# Patient Record
Sex: FEMALE | Race: WHITE | Employment: FULL TIME
[De-identification: names, ages, dates, MRNs, and addresses within clinical notes are randomized per-mention and may not be internally consistent; named-entity substitution may affect disease eponyms.]

---

## 2017-06-17 ENCOUNTER — HEALTH MAINTENANCE LETTER (OUTPATIENT)
Age: 37
End: 2017-06-17

## 2018-10-24 ENCOUNTER — HOSPITAL ENCOUNTER (EMERGENCY)
Facility: CLINIC | Age: 38
Discharge: HOME | End: 2018-10-24
Attending: EMERGENCY MEDICINE | Admitting: EMERGENCY MEDICINE
Payer: COMMERCIAL

## 2018-10-24 ENCOUNTER — APPOINTMENT (OUTPATIENT)
Dept: ULTRASOUND IMAGING | Facility: CLINIC | Age: 38
End: 2018-10-24
Attending: EMERGENCY MEDICINE
Payer: COMMERCIAL

## 2018-10-24 VITALS
TEMPERATURE: 98 F | OXYGEN SATURATION: 98 % | SYSTOLIC BLOOD PRESSURE: 114 MMHG | DIASTOLIC BLOOD PRESSURE: 70 MMHG | RESPIRATION RATE: 18 BRPM

## 2018-10-24 DIAGNOSIS — R10.9 ABDOMINAL PAIN, UNSPECIFIED ABDOMINAL LOCATION: ICD-10-CM

## 2018-10-24 LAB
ALBUMIN SERPL-MCNC: 3.6 G/DL (ref 3.4–5)
ALBUMIN UR-MCNC: NEGATIVE MG/DL
ALP SERPL-CCNC: 60 U/L (ref 40–150)
ALT SERPL W P-5'-P-CCNC: 20 U/L (ref 0–50)
ANION GAP SERPL CALCULATED.3IONS-SCNC: 5 MMOL/L (ref 3–14)
APPEARANCE UR: CLEAR
AST SERPL W P-5'-P-CCNC: 21 U/L (ref 0–45)
BASOPHILS # BLD AUTO: 0 10E9/L (ref 0–0.2)
BASOPHILS NFR BLD AUTO: 0.3 %
BILIRUB SERPL-MCNC: 0.6 MG/DL (ref 0.2–1.3)
BILIRUB UR QL STRIP: NEGATIVE
BUN SERPL-MCNC: 9 MG/DL (ref 7–30)
CALCIUM SERPL-MCNC: 8.5 MG/DL (ref 8.5–10.1)
CHLORIDE SERPL-SCNC: 106 MMOL/L (ref 94–109)
CO2 SERPL-SCNC: 29 MMOL/L (ref 20–32)
COLOR UR AUTO: YELLOW
CREAT SERPL-MCNC: 0.78 MG/DL (ref 0.52–1.04)
DIFFERENTIAL METHOD BLD: NORMAL
EOSINOPHIL # BLD AUTO: 0.1 10E9/L (ref 0–0.7)
EOSINOPHIL NFR BLD AUTO: 1.5 %
ERYTHROCYTE [DISTWIDTH] IN BLOOD BY AUTOMATED COUNT: 13 % (ref 10–15)
GFR SERPL CREATININE-BSD FRML MDRD: 83 ML/MIN/1.7M2
GLUCOSE SERPL-MCNC: 91 MG/DL (ref 70–99)
GLUCOSE UR STRIP-MCNC: NEGATIVE MG/DL
HCG UR QL: NEGATIVE
HCT VFR BLD AUTO: 41.1 % (ref 35–47)
HGB BLD-MCNC: 13.7 G/DL (ref 11.7–15.7)
HGB UR QL STRIP: NEGATIVE
IMM GRANULOCYTES # BLD: 0 10E9/L (ref 0–0.4)
IMM GRANULOCYTES NFR BLD: 0.3 %
KETONES UR STRIP-MCNC: NEGATIVE MG/DL
LACTATE BLD-SCNC: 0.6 MMOL/L (ref 0.7–2)
LEUKOCYTE ESTERASE UR QL STRIP: NEGATIVE
LIPASE SERPL-CCNC: 165 U/L (ref 73–393)
LYMPHOCYTES # BLD AUTO: 1.4 10E9/L (ref 0.8–5.3)
LYMPHOCYTES NFR BLD AUTO: 16.1 %
MCH RBC QN AUTO: 33 PG (ref 26.5–33)
MCHC RBC AUTO-ENTMCNC: 33.3 G/DL (ref 31.5–36.5)
MCV RBC AUTO: 99 FL (ref 78–100)
MONOCYTES # BLD AUTO: 0.5 10E9/L (ref 0–1.3)
MONOCYTES NFR BLD AUTO: 5.7 %
MUCOUS THREADS #/AREA URNS LPF: PRESENT /LPF
NEUTROPHILS # BLD AUTO: 6.6 10E9/L (ref 1.6–8.3)
NEUTROPHILS NFR BLD AUTO: 76.1 %
NITRATE UR QL: NEGATIVE
NRBC # BLD AUTO: 0 10*3/UL
NRBC BLD AUTO-RTO: 0 /100
PH UR STRIP: 7 PH (ref 5–7)
PLATELET # BLD AUTO: 171 10E9/L (ref 150–450)
POTASSIUM SERPL-SCNC: 4.2 MMOL/L (ref 3.4–5.3)
PROT SERPL-MCNC: 7 G/DL (ref 6.8–8.8)
RBC # BLD AUTO: 4.15 10E12/L (ref 3.8–5.2)
RBC #/AREA URNS AUTO: 1 /HPF (ref 0–2)
SODIUM SERPL-SCNC: 140 MMOL/L (ref 133–144)
SOURCE: ABNORMAL
SP GR UR STRIP: 1.02 (ref 1–1.03)
SQUAMOUS #/AREA URNS AUTO: 3 /HPF (ref 0–1)
TROPONIN I SERPL-MCNC: <0.015 UG/L (ref 0–0.04)
UROBILINOGEN UR STRIP-MCNC: 2 MG/DL (ref 0–2)
WBC # BLD AUTO: 8.6 10E9/L (ref 4–11)
WBC #/AREA URNS AUTO: 1 /HPF (ref 0–5)

## 2018-10-24 PROCEDURE — 81025 URINE PREGNANCY TEST: CPT | Performed by: EMERGENCY MEDICINE

## 2018-10-24 PROCEDURE — 85025 COMPLETE CBC W/AUTO DIFF WBC: CPT | Performed by: EMERGENCY MEDICINE

## 2018-10-24 PROCEDURE — 93005 ELECTROCARDIOGRAM TRACING: CPT

## 2018-10-24 PROCEDURE — 80053 COMPREHEN METABOLIC PANEL: CPT | Performed by: EMERGENCY MEDICINE

## 2018-10-24 PROCEDURE — 25000125 ZZHC RX 250: Performed by: EMERGENCY MEDICINE

## 2018-10-24 PROCEDURE — 76705 ECHO EXAM OF ABDOMEN: CPT

## 2018-10-24 PROCEDURE — 81001 URINALYSIS AUTO W/SCOPE: CPT | Performed by: EMERGENCY MEDICINE

## 2018-10-24 PROCEDURE — 83605 ASSAY OF LACTIC ACID: CPT | Performed by: EMERGENCY MEDICINE

## 2018-10-24 PROCEDURE — 93976 VASCULAR STUDY: CPT

## 2018-10-24 PROCEDURE — 83690 ASSAY OF LIPASE: CPT | Performed by: EMERGENCY MEDICINE

## 2018-10-24 PROCEDURE — 84484 ASSAY OF TROPONIN QUANT: CPT | Performed by: EMERGENCY MEDICINE

## 2018-10-24 PROCEDURE — 25000132 ZZH RX MED GY IP 250 OP 250 PS 637: Performed by: EMERGENCY MEDICINE

## 2018-10-24 PROCEDURE — 99285 EMERGENCY DEPT VISIT HI MDM: CPT | Mod: 25

## 2018-10-24 RX ADMIN — LIDOCAINE HYDROCHLORIDE 30 ML: 20 SOLUTION ORAL; TOPICAL at 19:23

## 2018-10-24 ASSESSMENT — ENCOUNTER SYMPTOMS
ACTIVITY CHANGE: 0
VOMITING: 0
NAUSEA: 1
APPETITE CHANGE: 0
SHORTNESS OF BREATH: 1
FREQUENCY: 0
DYSURIA: 0
HEMATURIA: 0
ABDOMINAL PAIN: 1

## 2018-10-24 NOTE — ED AVS SNAPSHOT
[facility]    [address]    [facility] [address]    Phone:  [phone]    Fax:  [phone]                                       Kvng Gillis   MRN: [id number]    Department:  [facility]   Date of Visit:  10/24/2018           After Visit Summary Signature Page     I have received my discharge instructions, and my questions have been answered. I have discussed any challenges I see with this plan with the nurse or doctor.    ..........................................................................................................................................  Patient/Patient Representative Signature      ..........................................................................................................................................  Patient Representative Print Name and Relationship to Patient    ..................................................               ................................................  Date                                   Time    ..........................................................................................................................................  Reviewed by Signature/Title    ...................................................              ..............................................  Date                                               Time          22EPIC Rev 08/18

## 2018-10-24 NOTE — ED TRIAGE NOTES
Patient presents to ED due to upper abd pain. Onset developed this AM along side nausea  Denies fever, vomiting or diarrhea    ABC intact     Denies taking OTC meds     Reports having acid reflux 2 nights ago

## 2018-10-24 NOTE — ED AVS SNAPSHOT
[facility]    [address]    [facility] [address]    Phone:  [phone]    Fax:  [phone]                                       Kvng Gillis   MRN: [id number]    Department:  [facility]   Date of Visit:  10/24/2018           Patient Information     Date Of Birth          1980        Your diagnoses for this visit were:     Abdominal pain, unspecified abdominal location        You were seen by Carroll Poon MD.      Follow-up Information     Follow up with Claudette Zafar. Schedule an appointment as soon as possible for a visit in 3 days.    Contact information:    [address], [address]  [place] [address]  [phone]          Go to [facility] Emergency [facility].    Specialty:  EMERGENCY MEDICINE    Why:  If symptoms worsen    Contact information:    [address]  [facility] [address]  [phone]        Discharge Instructions       Discharge Instructions  Abdominal Pain    Abdominal pain (belly pain) can be caused by many things. Your evaluation today does not show the exact cause for your pain. Your provider today has decided that it is unlikely your pain is due to a life threatening problem, or a problem requiring surgery or hospital admission. Sometimes those problems cannot be found right away, so it is very important that you follow up as directed.  Sometimes only the changes which occur over time allow the cause of your pain to be found.    Generally, every Emergency Department visit should have a follow-up clinic visit with either a primary or a specialty clinic/provider. Please follow-up as instructed by your emergency provider today. With abdominal pain, we often recommend very close follow-up, such as the following day.    ADULTS:  Return to the Emergency Department right away if:      You get an oral temperature above 102oF or as directed by your  provider.    You have blood in your stools. This may be bright red or appear as black, tarry stools.      You keep vomiting (throwing up) or cannot drink liquids.    You see blood when you vomit.     You cannot have a bowel movement or you cannot pass gas.    Your stomach gets bloated or bigger.    Your skin or the whites of your eyes look yellow.    You faint.    You have bloody, frequent or painful urination (peeing).    You have new symptoms or anything that worries you.    CHILDREN:  Return to the Emergency Department right away if your child has any of the above-listed symptoms or the following:      Pushes your hand away or screams/cries when his/her belly is touched.    You notice your child is very fussy or weak.    Your child is very tired and is too tired to eat or drink.    Your child is dehydrated.  Signs of dehydration can be:  o Significant change in the amount of wet diapers/urine.  o Your infant or child starts to have dry mouth and lips, or no saliva (spit) or tears.    PREGNANT WOMEN:  Return to the Emergency Department right away if you have any of the above-listed symptoms or the following:      You have bleeding, leaking fluid or passing tissue from the vagina.    You have worse pain or cramping, or pain in your shoulder or back.    You have vomiting that will not stop.    You have a temperature of 100oF or more.    Your baby is not moving as much as usual.    You faint.    You get a bad headache with or without eye problems and abdominal pain.    You have a seizure.    You have unusual discharge from your vagina and abdominal pain.    Abdominal pain is pretty common during pregnancy.  Your pain may or may not be related to your pregnancy. You should follow-up closely with your OB provider so they can evaluate you and your baby.  Until you follow-up with your regular provider, do the following:       Avoid sex and do not put anything in your vagina.    Drink clear fluids.    Only take  "medications approved by your provider.    MORE INFORMATION:    Appendicitis:  A possible cause of abdominal pain in any person who still has their appendix is acute appendicitis. Appendicitis is often hard to diagnose.  Testing does not always rule out early appendicitis or other causes of abdominal pain. Close follow-up with your provider and re-evaluations may be needed to figure out the reason for your abdominal pain.    Follow-up:  It is very important that you make an appointment with your clinic and go to the appointment.  If you do not follow-up with your primary provider, it may result in missing an important development which could result in permanent injury or disability and/or lasting pain.  If there is any problem keeping your appointment, call your provider or return to the Emergency Department.    Medications:  Take your medications as directed by your provider today.  Before using over-the-counter medications, ask your provider and make sure to take the medications as directed.  If you have any questions about medications, ask your provider.    Diet:  Resume your normal diet as much as possible, but do not eat fried, fatty or spicy foods while you have pain.  Do not drink alcohol or have caffeine.  Do not smoke tobacco.    Probiotics: If you have been given an antibiotic, you may want to also take a probiotic pill or eat yogurt with live cultures. Probiotics have \"good bacteria\" to help your intestines stay healthy. Studies have shown that probiotics help prevent diarrhea (loose stools) and other intestine problems (including C. diff infection) when you take antibiotics. You can buy these without a prescription in the pharmacy section of the store.     If you were given a prescription for medicine here today, be sure to read all of the information (including the package insert) that comes with your prescription.  This will include important information about the medicine, its side effects, and any " warnings that you need to know about.  The pharmacist who fills the prescription can provide more information and answer questions you may have about the medicine.  If you have questions or concerns that the pharmacist cannot address, please call or return to the Emergency Department.       Remember that you can always come back to the Emergency Department if you are not able to see your regular provider in the amount of time listed above, if you get any new symptoms, or if there is anything that worries you.      24 Hour Appointment Hotline       To make an appointment at any [facility], call [phone] ([phone]). If you don't have a family doctor or clinic, we will help you find one. [facility] clinics are conveniently located to serve the needs of you and your family.             Review of your medicines      Our records show that you are taking the medicines listed below. If these are incorrect, please call your family doctor or clinic.        Dose / Directions Last dose taken    EFFEXOR 37.5 MG tablet   Dose:  37.5 mg   Generic drug:  venlafaxine        Take 37.5 mg by mouth once.   Refills:  0        fluticasone 50 MCG/ACT spray   Commonly known as:  FLONASE   Dose:  1-2 spray   Quantity:  1 Package        Spray 1-2 sprays into both nostrils daily.   Refills:  11                Procedures and tests performed during your visit     Abdomen US, limited (RUQ only)    CBC with platelets differential    Comprehensive metabolic panel    EKG 12-lead, tracing only    HCG qualitative urine (UPT)    Lactic acid whole blood    Lipase    Troponin I    UA with Microscopic    US Pelvis Cmplt w Transvag & Doppler LmtPel Duplex Limited      Orders Needing Specimen Collection     None      Pending Results     No orders found from 10/22/2018 to 10/25/2018.            Pending Culture Results     No orders found from 10/22/2018 to 10/25/2018.            Pending Results Instructions     If you had any lab results that  were not finalized at the time of your Discharge, you can call the ED Lab Result RN at [phone]. You will be contacted by this team for any positive Lab results or changes in treatment. The nurses are available 7 days a week from 10A to 6:30P.  You can leave a message 24 hours per day and they will return your call.        Test Results From Your Hospital Stay        10/24/2018  4:51 PM      Component Results     Component Value Ref Range & Units Status    WBC 8.6 4.0 - 11.0 10e9/L Final    RBC Count 4.15 3.8 - 5.2 10e12/L Final    Hemoglobin 13.7 11.7 - 15.7 g/dL Final    Hematocrit 41.1 35.0 - 47.0 % Final    MCV 99 78 - 100 fl Final    MCH 33.0 26.5 - 33.0 pg Final    MCHC 33.3 31.5 - 36.5 g/dL Final    RDW 13.0 10.0 - 15.0 % Final    Platelet Count 171 150 - 450 10e9/L Final    Diff Method Automated Method  Final    % Neutrophils 76.1 % Final    % Lymphocytes 16.1 % Final    % Monocytes 5.7 % Final    % Eosinophils 1.5 % Final    % Basophils 0.3 % Final    % Immature Granulocytes 0.3 % Final    Nucleated RBCs 0 0 /100 Final    Absolute Neutrophil 6.6 1.6 - 8.3 10e9/L Final    Absolute Lymphocytes 1.4 0.8 - 5.3 10e9/L Final    Absolute Monocytes 0.5 0.0 - 1.3 10e9/L Final    Absolute Eosinophils 0.1 0.0 - 0.7 10e9/L Final    Absolute Basophils 0.0 0.0 - 0.2 10e9/L Final    Abs Immature Granulocytes 0.0 0 - 0.4 10e9/L Final    Absolute Nucleated RBC 0.0  Final         10/24/2018  5:09 PM      Component Results     Component Value Ref Range & Units Status    Sodium 140 133 - 144 mmol/L Final    Potassium 4.2 3.4 - 5.3 mmol/L Final    Chloride 106 94 - 109 mmol/L Final    Carbon Dioxide 29 20 - 32 mmol/L Final    Anion Gap 5 3 - 14 mmol/L Final    Glucose 91 70 - 99 mg/dL Final    Urea Nitrogen 9 7 - 30 mg/dL Final    Creatinine 0.78 0.52 - 1.04 mg/dL Final    GFR Estimate 83 >60 mL/min/1.7m2 Final    Non  GFR Calc    GFR Estimate If Black >90 >60 mL/min/1.7m2 Final    African American GFR Calc     Calcium 8.5 8.5 - 10.1 mg/dL Final    Bilirubin Total 0.6 0.2 - 1.3 mg/dL Final    Albumin 3.6 3.4 - 5.0 g/dL Final    Protein Total 7.0 6.8 - 8.8 g/dL Final    Alkaline Phosphatase 60 40 - 150 U/L Final    ALT 20 0 - 50 U/L Final    AST 21 0 - 45 U/L Final         10/24/2018  5:09 PM      Component Results     Component Value Ref Range & Units Status    Lipase 165 73 - 393 U/L Final         10/24/2018  5:09 PM      Component Results     Component Value Ref Range & Units Status    Lactic Acid 0.6 (L) 0.7 - 2.0 mmol/L Final         10/24/2018  5:09 PM      Component Results     Component Value Ref Range & Units Status    Troponin I ES <0.015 0.000 - 0.045 ug/L Final    The 99th percentile for upper reference range is 0.045 ug/L.  Troponin values   in the range of 0.045 - 0.120 ug/L may be associated with risks of adverse   clinical events.           10/24/2018  5:15 PM      Component Results     Component Value Ref Range & Units Status    Color Urine Yellow  Final    Appearance Urine Clear  Final    Glucose Urine Negative NEG^Negative mg/dL Final    Bilirubin Urine Negative NEG^Negative Final    Ketones Urine Negative NEG^Negative mg/dL Final    Specific Gravity Urine 1.018 1.003 - 1.035 Final    Blood Urine Negative NEG^Negative Final    pH Urine 7.0 5.0 - 7.0 pH Final    Protein Albumin Urine Negative NEG^Negative mg/dL Final    Urobilinogen mg/dL 2.0 0.0 - 2.0 mg/dL Final    Nitrite Urine Negative NEG^Negative Final    Leukocyte Esterase Urine Negative NEG^Negative Final    Source Midstream Urine  Final    WBC Urine 1 0 - 5 /HPF Final    RBC Urine 1 0 - 2 /HPF Final    Squamous Epithelial /HPF Urine 3 (H) 0 - 1 /HPF Final    Mucous Urine Present (A) NEG^Negative /LPF Final         10/24/2018  5:14 PM      Component Results     Component Value Ref Range & Units Status    HCG Qual Urine Negative NEG^Negative Final    This test is for screening purposes.  Results should be interpreted along with   the clinical  picture.  Confirmation testing is available if warranted by   ordering [id number], HCG Quantitative Pregnancy.           10/24/2018  5:34 PM      Narrative     ULTRASOUND ABDOMEN LIMITED  10/24/2018 5:22 PM     HISTORY: Colicky upper abdominal pain.    COMPARISON: None.    FINDINGS:  Liver is unremarkable in echogenicity without focal  lesions. Gallbladder demonstrates no cholelithiasis or cholecystitis.  Extrahepatic bile duct is normal in diameter. Pancreas is completely  obscured. Right kidney is normal in size. There is no hydronephrosis.         Impression     IMPRESSION:  Negative abdominal ultrasound.    KINZA YAN MD         10/24/2018  7:16 PM      Narrative     ULTRASOUND PELVIS DOPPLER   WITH TRANSVAGINAL IMAGING  10/24/2018 6:58  PM     HISTORY: Pelvic pain.     COMPARISON: November 17, 2009    TECHNIQUE: Transvaginal images were performed to better evaluate the  patient's uterus, ovaries and endometrial stripe. Grayscale, color  Doppler, and Doppler spectral waveform analysis performed.    FINDINGS:  No fibroids are evident. The uterus is 9.6 x 5.3 x 6.0 cm.  Tiny nabothian cysts noted. Endometrial stripe measures 6 mm and is  normal for patient's age and menstrual status. The right ovary is  normal. The left ovary is not seen due to bowel gas. Doppler spectral  waveform analysis demonstrates blood flow to the right ovary. No  adnexal masses are present. No free pelvic fluid is present.        Impression     IMPRESSION: No right torsion demonstrated. Left ovary not seen.    KINZA YAN MD                Clinical Quality Measure: Blood Pressure Screening     Your blood pressure was checked while you were in the emergency department today. The last reading we obtained was  BP: 114/70 . Please read the guidelines below about what these numbers mean and what you should do about them.  If your systolic blood pressure (the top number) is less than 120 and your diastolic blood pressure (the bottom number) is  "less than 80, then your blood pressure is normal. There is nothing more that you need to do about it.  If your systolic blood pressure (the top number) is 120-139 or your diastolic blood pressure (the bottom number) is 80-89, your blood pressure may be higher than it should be. You should have your blood pressure rechecked within a year by a primary care provider.  If your systolic blood pressure (the top number) is 140 or greater or your diastolic blood pressure (the bottom number) is 90 or greater, you may have high blood pressure. High blood pressure is treatable, but if left untreated over time it can put you at risk for heart attack, stroke, or kidney failure. You should have your blood pressure rechecked by a primary care provider within the next 4 weeks.  If your provider in the emergency department today gave you specific instructions to follow-up with your doctor or provider even sooner than that, you should follow that instruction and not wait for up to 4 weeks for your follow-up visit.        Thank you for choosing [facility]       Thank you for choosing [facility] for your care. Our goal is always to provide you with excellent care. Hearing back from our patients is one way we can continue to improve our services. Please take a few minutes to complete the written survey that you may receive in the mail after you visit with us. Thank you!        "[employer]" Information     "[employer]" lets you send messages to your doctor, view your test results, renew your prescriptions, schedule appointments and more. To sign up, go to [email or web address]" . Click on \"Log in\" on the left side of the screen, which will take you to the Welcome page. Then click on \"Sign up Now\" on the right side of the page.     You will be asked to enter the access code listed below, as well as some personal information. Please follow the directions to create your username and password.     Your access code is: [email or web address]  Expires: 1/22/2019  " 7:54 PM     Your access code will  in 90 days. If you need help or a new code, please call your [facility] clinic or [phone].        Care EveryWhere ID     This is your Care EveryWhere ID. This could be used by other organizations to access your [facility] medical records  [id number]        Equal Access to Services     ABHISHEK ALEXIS : Ishmael King, wajacob carolina, vivek lealalluis gaston, jojo rios . So [facility] [phone].    ATENCIÓN: Si habla español, tiene a schwab disposición servicios gratuitos de asistencia lingüística. Leslieame al [phone].    We comply with applicable federal civil rights laws and [place] laws. We do not discriminate on the basis of race, color, national origin, age, disability, sex, sexual orientation, or gender identity.            After Visit Summary       This is your record. Keep this with you and show to your community pharmacist(s) and doctor(s) at your next visit.

## 2018-10-24 NOTE — ED PROVIDER NOTES
History     Chief Complaint:  Abdominal Pain      HPI   Kvng Gillis is a 38 year old female, with a history of GERD, who presents with her  to the ED for evaluation of abdominal pain. The patient reports she developed intermittent sharp cramping pain at 9:00AM today. The pain is located at her upper abdomen and radiates into her mid abdomen. The pain lasts for approximately 10-30 seconds. She could be pain free up to 30 minutes. Lying down alleviates the pain while sitting up and movement worsens the pain. Eating does not worsen the pain. Her pain is not similar to her GERD 2 nights ago.  She had associated difficulty breathing due to the pain. The patient notes she feels subjectively feverish with nausea. She has not taken medications for her symptoms. She had a normal bowel movement today. The patient reports she had a tummy tuck 7 months ago. The patient denies any new activities, appetite change, vomiting, urinary frequency, dysuria, hematuria, vaginal discharge, or vaginal bleeding. She denies history of cholecystectomy or appendectomy.      Allergies:  No known drug allergies     Medications:    Effexor     Past Medical History:    GERD    Past Surgical History:    Abdominoplasty   Orthopedic surgery     Family History:    History reviewed. No pertinent family history.     Social History:  Smoking status: Current every day smoker, 0.25ppd  Alcohol use: Yes  Presents to ED with     Marital Status:  Single [1]     Review of Systems   Constitutional: Negative for activity change and appetite change.   Respiratory: Positive for shortness of breath.    Gastrointestinal: Positive for abdominal pain and nausea. Negative for vomiting.   Genitourinary: Negative for dysuria, frequency, hematuria, vaginal bleeding and vaginal discharge.   All other systems reviewed and are negative.    Physical Exam     Patient Vitals for the past 24 hrs:   BP Temp Temp src Heart Rate Resp SpO2   10/24/18 1815 114/70 -  - - - 98 %   10/24/18 1645 116/75 - - - - 99 %   10/24/18 1630 116/78 - - - - 98 %   10/24/18 1625 130/78 98  F (36.7  C) Oral 84 18 100 %     Physical Exam    HENT: Moist oral mucosa.   Eyes: Grossly normal vision. Pupils are equal and round. Extraocular movements intact.  Sclera clear and without icterus. Conjunctiva without pallor.  Cardiovascular: Normal rate. Regular rhythm. S1 and S2 without audible murmurs. 2+ radial pulses. Normal capillary refill.  Respiratory: Effort normal. Clear breath sounds bilaterally.  Gastrointestinal: Soft. No distension. No tenderness to palpation. No rebound or guarding.  Neurologic: Alert and awake. Coordinated movement of extremities. Speech normal.  Skin: No diaphoresis, rashes, ecchymoses, or lesions.  Musculoskeletal: No lower extremity edema. No gross deformity. No joint swelling.  Psychiatric: Appropriate affect. Behavior is normal. Intact recent and remote memory. Linear thought process.      Emergency Department Course     ECG (16:35:58):  Rate 76 bpm. OR interval 144. QRS duration 86. QT/QTc 388/4365. P-R-T axes 69 76 75. Normal sinus rhythm. Normal ECG. Interpreted at 1641 by Carroll Poon MD.    Imaging:  Radiographic findings were communicated with the patient and family who voiced understanding of the findings.    Abdomen US, Limited (RUQ Only)  IMPRESSION:  Negative abdominal ultrasound. As read by Radiology.     US Pelvic Cmplt w Transvag & Doppler LmtPel Duplex Limited  IMPRESSION: No right torsion demonstrated. Left ovary not seen. As read by Radiology.     Laboratory:  HCG urine-UPT: Negative   UA: Squamous epithelial 3(H), Mucous present, o/w Negative     Troponin I (1644): <0.015  Lactic acid (1709): 0.6(L)    Lipase: 165  CBC: o/w WNL (WBC 8.6, HGB 13.7, )  CMP: o/w WNL (Creatinine 0.78)     Interventions:  1923: GI Cocktail - Mylanta/Maalox 15 mL, Viscous Lidocaine 2% 15 mL, 30 mLs PO    Emergency Department Course:  Past medical records, nursing  notes, and vitals reviewed.  1614: I performed an exam of the patient and obtained history, as documented above.    IV inserted and blood drawn.    The patient was sent for a limited abdomen ultrasound while in the emergency department, findings above.    1742: I rechecked the patient. Explained findings to patient and .    The patient was sent for a complete pelvic with transvaginal ultrasound while in the emergency department, findings above.    1938: I rechecked the patient. Explained findings to patient and .    Findings and plan explained to the Patient and spouse. Patient discharged home with instructions regarding supportive care, medications, and reasons to return. The importance of close follow-up was reviewed.     Impression & Plan      Medical Decision Making:  Patient presents with colicky abdominal pain that started earlier today.  She has no abdominal pain on my exam in the emergency department and her abdominal exam is benign without tenderness.  Vitals are normal.  Laboratory studies are unremarkable.  Right upper quadrant ultrasound was reveals no cholelithiasis.  Pelvic ultrasound was performed to rule out intermittent torsion and is unable to visualize the left ovary, but with normal right ovarian blood flow.  Given that the patient's symptoms are mostly of the abdomen, I do not feel that this is likely to be pelvic in etiology.  Urinalysis shows no markers of blood to suggest ureteral colic.  The patient's symptoms were improving in the emergency department she did not have pain for a long period of time.  She is able to tolerate oral intake without any nausea, vomiting, increased pain.  She was informed of the results of her workup and then no clear etiology of her pain was found, but that this could still be an early process.  It was explained to the patient that the next step would be a CT scan but that since she is young and healthy, the risk of radiation is higher, and it is  reasonable to monitor her symptoms at home and only perform a CT scan for more consistent or worsening pain.  She is instructed strictly to return for any worsening pain.    Diagnosis:    ICD-10-CM   1. Abdominal pain, unspecified abdominal location R10.9     Disposition: Patient discharged to home with      Jessa Aranda  10/24/2018   [facility] EMERGENCY DEPARTMENT    Scribe Disclosure:  I, Jessa Rosi, am serving as a scribe at 4:14 PM on 10/24/2018 to document services personally performed by Carroll Poon MD based on my observations and the provider's statements to me.        Carroll Poon MD  10/25/18 0201

## 2018-10-25 LAB — INTERPRETATION ECG - MUSE: NORMAL
